# Patient Record
Sex: FEMALE | Race: WHITE
[De-identification: names, ages, dates, MRNs, and addresses within clinical notes are randomized per-mention and may not be internally consistent; named-entity substitution may affect disease eponyms.]

---

## 2021-03-18 ENCOUNTER — HOSPITAL ENCOUNTER (OUTPATIENT)
Dept: HOSPITAL 47 - RADMAMWWP | Age: 46
Discharge: HOME | End: 2021-03-18
Attending: OBSTETRICS & GYNECOLOGY
Payer: COMMERCIAL

## 2021-03-18 DIAGNOSIS — Z12.31: Primary | ICD-10-CM

## 2021-03-18 PROCEDURE — 77067 SCR MAMMO BI INCL CAD: CPT

## 2021-03-22 NOTE — MM
Reason for exam: screening  (asymptomatic).

Last mammogram was performed 5 years and 11 months ago.



History:

Patient is postmenopausal.

Took hormonal contraceptives for 10 years.



Physical Findings:

A clinical breast exam by your physician is recommended on an annual basis and 

results should be correlated with mammographic findings.



MG Screening Mammo w CAD

Bilateral CC and MLO view(s) were taken.

Prior study comparison: April 9, 2015, bilateral MG screening mammo w CAD.  

October 13, 2010, bilateral digital screening mammogram.

The breast tissue is heterogeneously dense. This may lower the sensitivity of 

mammography.  There is no discrete abnormality.





ASSESSMENT: Negative, BI-RAD 1



RECOMMENDATION:

Routine screening mammogram of both breasts in 1 year.

## 2022-07-06 ENCOUNTER — HOSPITAL ENCOUNTER (OUTPATIENT)
Dept: HOSPITAL 47 - RADUSWWP | Age: 47
Discharge: HOME | End: 2022-07-06
Attending: SURGERY
Payer: COMMERCIAL

## 2022-07-06 DIAGNOSIS — R10.11: Primary | ICD-10-CM

## 2022-07-06 PROCEDURE — 76705 ECHO EXAM OF ABDOMEN: CPT

## 2022-07-06 NOTE — US
EXAMINATION TYPE: US gallbladder

 

DATE OF EXAM: 7/6/2022

 

COMPARISON: NONE

 

CLINICAL HISTORY: R10.11 RUQ pain. Patient was having RUQ, but has stopped a few weeks ago.

 

EXAM MEASUREMENTS:

 

Liver Length:  12.4 cm   

Gallbladder Wall:  0.17 cm   

CBD:  0.36 cm

Right Kidney:  10.5 x 4.9 x 3.2 cm

 

 

 

Pancreas:  wnl

Liver:  wnl  

Gallbladder:  wnl

**Evidence for sonographic Wakefield's sign:  No

CBD:  wnl 

Right Kidney:  wnl 

 

 

 

IMPRESSION: No gallstones or ultrasound evidence for acute cholecystitis.

## 2022-08-31 ENCOUNTER — HOSPITAL ENCOUNTER (OUTPATIENT)
Dept: HOSPITAL 47 - ORWHC2ENDO | Age: 47
Discharge: HOME | End: 2022-08-31
Attending: SURGERY
Payer: COMMERCIAL

## 2022-08-31 VITALS — BODY MASS INDEX: 29.2 KG/M2

## 2022-08-31 VITALS — DIASTOLIC BLOOD PRESSURE: 68 MMHG | SYSTOLIC BLOOD PRESSURE: 111 MMHG | HEART RATE: 67 BPM

## 2022-08-31 VITALS — TEMPERATURE: 97 F | RESPIRATION RATE: 16 BRPM

## 2022-08-31 DIAGNOSIS — Z79.899: ICD-10-CM

## 2022-08-31 DIAGNOSIS — Z12.11: Primary | ICD-10-CM

## 2022-08-31 DIAGNOSIS — K64.0: ICD-10-CM

## 2022-08-31 DIAGNOSIS — Z98.891: ICD-10-CM

## 2022-08-31 DIAGNOSIS — Z89.9: ICD-10-CM

## 2022-08-31 PROCEDURE — 45378 DIAGNOSTIC COLONOSCOPY: CPT

## 2022-08-31 NOTE — P.GSHP
History of Present Illness


H&P Date: 22














CHIEF COMPLAINT: Colon screen





HISTORY OF PRESENT ILLNESS: The patient is a 46-year-old female who


presents for colon screen.  Lower endoscopy was offered for further evaluation 

and management.





PAST MEDICAL HISTORY: 


Please see list.





PAST SURGICAL HISTORY: 


Please see list.





MEDICATIONS: 


Please see list.





ALLERGIES:  Please see list. 





SOCIAL HISTORY: No illicit drug use





FAMILY HISTORY: No reports of Crohn disease or ulcerative colitis. 





REVIEW OF ORGAN SYSTEMS: 


CONSTITUTIONAL: No reports of fevers or chills.  





PHYSICAL EXAM: 


VITAL SIGNS:  Stable


GENERAL: Well-developed pleasant in no acute distress. 


HEENT: No scleral icterus. Extraocular movements grossly


intact. Moist buccal mucosa. 


NECK: Supple without lymphadenopathy. 


CHEST: Unlabored respirations. Equal bilateral excursions. 


CARDIOVASCULAR: Regular rate and rhythm. Distal 2+ pulses. 


ABDOMEN: Soft, nontender, nondistended. 


MUSCULOSKELETAL: No clubbing, cyanosis, or edema. 





ASSESSMENT: 


1.  Colon screen.





PLAN: 


1. Recommend proceeding with a lower endoscopy





Past Medical History


Past Medical History: No Reported History


History of Any Multi-Drug Resistant Organisms: None Reported


Past Surgical History:  Section


Additional Past Surgical History / Comment(s): bunionectomy


Past Anesthesia/Blood Transfusion Reactions: No Reported Reaction, Family 

History of Problems w/ Anesthesia


Additional Past Anesthesia/Blood Transfusion Reaction / Comment(s): mother= 

difficulty waking up


Past Psychological History: No Psychological Hx Reported


Smoking Status: Never smoker


Past Alcohol Use History: None Reported


Past Drug Use History: None Reported





- Past Family History


  ** Mother


Family Medical History: No Reported History





Medications and Allergies


                                Home Medications











 Medication  Instructions  Recorded  Confirmed  Type


 


Iron (Unknown Dose) 1 dose PO DAILY 22 History


 


Multivit with Calcium,Iron,Min 1 each PO DAILY 22 History





[Women's Multivitamin]    


 


Zinc (Unknown Dose) 1 dose PO DAILY 22 History








                                    Allergies











Allergy/AdvReac Type Severity Reaction Status Date / Time


 


No Known Allergies Allergy   Verified 22 07:25














Surgical - Exam


                                   Vital Signs











Temp Pulse Resp BP Pulse Ox


 


 97.0 F L  73   16   120/61   99 


 


 22 07:16  22 07:16  22 07:16  22 07:16  22 07:16

## 2022-08-31 NOTE — P.PCN
Date of Procedure: 08/31/22


Description of Procedure: 








PREOPERATIVE DIAGNOSIS:


Colonoscopy screening.





POSTOPERATIVE DIAGNOSIS:


Colonoscopy screening.








OPERATION:


Colonoscopy to the cecum, ileocecal valve and appendiceal orifice.





SURGEON: Samantha Jason MD.





ANESTHESIA: MAC.





INDICATIONS:


The patient is a 46-year-old female who presents for colonoscopy screening.  

Benefits and risks were described and informed consent was obtained.





DESCRIPTION OF PROCEDURE:


The patient had undergone Sutab prep.  The patient had been brought into the 

operating room and laid in the left lateral decubitus position. After adequate 

intravenous sedation, the rectum was examined with 2% lidocaine jelly. No 

external hemorrhoids were encountered. The rectal tone was within normal limits.

No lesions were palpated in the rectal vault. An Olympus colonoscope was 

advanced until the cecum, ileocecal valve and appendiceal orifice were clearly 

viewed.  The prep was excellent. No scattered diverticulosis was encountered.  

No colonic polyps were found.  No evidence of focal colitis was found. 

Retroflexion of the scope demonstrated grade 1 internal hemorrhoids without 

active bleeding or inflammation. The colon was desufflated. The patient had 

tolerated the procedure well. 





Withdrawal time was over 6 minutes.





FINDINGS:


Aronchick preparation quality scale 1 (1-5)


Internal hemorrhoids, grade 1


No external prolapsed hemorrhoids.


No arteriovenous malformations.


No adenomatous polyps.


No focal colitis.


No scattered diverticulosis was encountered.





RECOMMENDATIONS:


Lower endoscopy in 10 years, 2032





Plan - Discharge Summary


New Discharge Prescriptions: 


Continue


   Multivit with Calcium,Iron,Min [Women's Multivitamin] 1 each PO DAILY


   Iron (Unknown Dose) 1 dose PO DAILY


   Zinc (Unknown Dose) 1 dose PO DAILY


Discharge Medication List





Iron (Unknown Dose) 1 dose PO DAILY 08/30/22 [History]


Multivit with Calcium,Iron,Min [Women's Multivitamin] 1 each PO DAILY 08/30/22 

[History]


Zinc (Unknown Dose) 1 dose PO DAILY 08/30/22 [History]








Follow up Appointment(s)/Referral(s): 


Samantha Jason MD [STAFF PHYSICIAN] - As Needed


Patient Instructions/Handouts:  Colonoscopy (DC), *Surgery [facility] - (Anesthesia) 

Endoscopy Discharge Instructions


Activity/Diet/Wound Care/Special Instructions: 


Repeat colonoscopy  in 10 years, 2032


Discharge Disposition: HOME SELF-CARE

## 2022-10-04 ENCOUNTER — HOSPITAL ENCOUNTER (OUTPATIENT)
Dept: HOSPITAL 47 - RADMAMWWP | Age: 47
Discharge: HOME | End: 2022-10-04
Attending: OBSTETRICS & GYNECOLOGY
Payer: COMMERCIAL

## 2022-10-04 DIAGNOSIS — Z78.0: ICD-10-CM

## 2022-10-04 DIAGNOSIS — M81.0: ICD-10-CM

## 2022-10-04 DIAGNOSIS — Z12.31: Primary | ICD-10-CM

## 2022-10-04 PROCEDURE — 77080 DXA BONE DENSITY AXIAL: CPT

## 2022-10-04 PROCEDURE — 77067 SCR MAMMO BI INCL CAD: CPT

## 2022-10-04 NOTE — BD
EXAMINATION TYPE: Axial Bone Density

 

DATE OF EXAM: 10/4/2022

 

COMPARISON: NONE

 

CLINICAL HISTORY: 46 years year old Female.  ICD-10 CODE:  N95.1 Post menopausal

 

Height:  62

Weight:  137.8

 

FRAX RISK QUESTIONS:

Alcohol (3 or more units per day):  NO

Family History (Parent hip fracture):  NO

Glucocorticoids (More than 3mos):  NO

History of Fracture in Adulthood: YES, PELVIS, BILAT LOWER LEGS, 

 

Secondary Osteoporosis:

  1.  Type 1 Diabetes: NO

  2.  Hyperthyroidism: NO

  3.  Menopause before 45: NO

  4.  Malnutrition: NO

  5.  Chronic liver disease: NO

Rheumatoid Arthritis: NO

Current Tobacco Use: NO

 

RISK FACTORS 

HISTORY OF: 

Hip Fracture (Right/Left): NO

Spine Fracture: NO

History of Wrist Fracture: NO

Surgery to Spine/Hip(right/left)/Wrist (right/left): NO

Family History of Osteoporosis: NO

Active: YES

Diet low in dairy products/other sources of calcium:  NO

Postmenopausal woman: YES

Take estrogen and/or progesterone medications: NO

Lost more than 2 inches in height since high school: NO

Frequent falls: NO

Poor Health: NO

Hyperparathyroidism: NO

Adrenal Insufficiency: NO

 

MEDICATIONS: 

Prednisone or other steroids: NO

Thyroid Medications: NO 

Osteoporosis Medications: NO

Additional Medications: MULTI VIT., ZINC, B12, IRON

 

 

EXAM MEASUREMENTS: 

Bone mineral densitometry was performed using the [employer] System.

Bone mineral density as measured about the Lumbar spine is:  

----- L1-L4(G/cm2): 0.807

T Score Values are as follows:

----- L1: -2.4

----- L2: -3.7

----- L3: -2.6

----- L4: -3.8

----- L1-L4: -3.1

BASELINE STUDY

 

Bone mineral density about the R hip (g/cm2): 0.808

Bone mineral density about the L hip (g/cm2): 0.758

T Score values are as follows:

-----R Neck: -1.7

-----L Neck: -2.0

-----R Total: -0.9

-----L Total: -1.2

BASELINE STUDY 

 

 

FRAX%s: The graph provided illustrates a 8.6% chance for a major osteoporotic fx and a 1.4% chance fo
r the hips probability for fx in 10 years time.

 

IMPRESSION:

Osteoporosis (T Score less than -2.5).

 

There is increased fracture risk and therapy is usually indicated based on age.

 

Re-Screen 1-2 years.

 

NOTE:  T-SCORE=SD OF THE YOUNG ADULT MEAN.

## 2022-10-04 NOTE — MM
Reason for Exam: Screening  (asymptomatic). 

Last mammogram was performed 1 year(s) and 7 month(s) ago. 





Patient History: 

Menarche at age 11. First Full-Term Pregnancy at age 29. Late child-bearing (after 29).

Postmenopausal. Patient used Hormonal Contraceptives for 10 years. 





Risk Values: 

Mima 5 year model risk: 1.2%.

NCI Lifetime model risk: 12.7%.





Prior Study Comparison: 

10/13/2010 Bilateral Screening Mammogram, [facility]. 4/9/2015 Bilateral Screening Mammogram, [facility].

3/18/2021 Bilateral Screening Mammogram, [facility]. 





Tissue Density: 

The breast tissue is heterogeneously dense. This may lower the sensitivity of mammography.





Findings: 

Analyzed By CAD. 

There is no suspicious group of microcalcifications or new suspicious mass in either breast. Stable

chronic nodularity within the right breast. No significant change from prior exams. 





Overall Assessment: Benign, BI-RAD 2





Management: 

Screening Mammogram of both breasts in 1 year.

A clinical breast exam by your physician is recommended on an annual basis and results should be

correlated with mammographic findings.



Electronically signed and approved by: Jesu Zaidi D.O.

## 2022-10-11 ENCOUNTER — HOSPITAL ENCOUNTER (OUTPATIENT)
Dept: HOSPITAL 47 - LABWHC1 | Age: 47
Discharge: HOME | End: 2022-10-11
Attending: OBSTETRICS & GYNECOLOGY
Payer: COMMERCIAL

## 2022-10-11 DIAGNOSIS — Z13.29: ICD-10-CM

## 2022-10-11 DIAGNOSIS — R53.83: ICD-10-CM

## 2022-10-11 DIAGNOSIS — Z13.1: ICD-10-CM

## 2022-10-11 DIAGNOSIS — Z00.00: Primary | ICD-10-CM

## 2022-10-11 DIAGNOSIS — Z13.220: ICD-10-CM

## 2022-10-11 LAB
ALBUMIN SERPL-MCNC: 4.4 G/DL (ref 3.8–4.9)
ALBUMIN/GLOB SERPL: 1.69 G/DL (ref 1.6–3.17)
ALP SERPL-CCNC: 69 U/L (ref 41–126)
ALT SERPL-CCNC: 23 U/L (ref 8–44)
ANION GAP SERPL CALC-SCNC: 7.9 MMOL/L (ref 10–18)
AST SERPL-CCNC: 24 U/L (ref 13–35)
BUN SERPL-SCNC: 13.1 MG/DL (ref 9–27)
BUN/CREAT SERPL: 16.38 RATIO (ref 12–20)
CALCIUM SPEC-MCNC: 9.3 MG/DL (ref 8.7–10.3)
CHLORIDE SERPL-SCNC: 106 MMOL/L (ref 96–109)
CO2 SERPL-SCNC: 27.1 MMOL/L (ref 20–27.5)
ERYTHROCYTE [DISTWIDTH] IN BLOOD BY AUTOMATED COUNT: 4.49 X 10*6/UL (ref 4.1–5.2)
ERYTHROCYTE [DISTWIDTH] IN BLOOD: 12.3 % (ref 11.5–14.5)
GLOBULIN SER CALC-MCNC: 2.6 G/DL (ref 1.6–3.3)
GLUCOSE SERPL-MCNC: 91 MG/DL (ref 70–110)
HCT VFR BLD AUTO: 42.1 % (ref 37.2–46.3)
HGB BLD-MCNC: 14 G/DL (ref 12–15)
MCH RBC QN AUTO: 31.2 PG (ref 27–32)
MCHC RBC AUTO-ENTMCNC: 33.3 G/DL (ref 32–37)
MCV RBC AUTO: 93.8 FL (ref 80–97)
NRBC BLD AUTO-RTO: 0 /100 WBCS (ref 0–0)
PLATELET # BLD AUTO: 222 X 10*3/UL (ref 140–440)
POTASSIUM SERPL-SCNC: 4.1 MMOL/L (ref 3.5–5.5)
PROT SERPL-MCNC: 7 G/DL (ref 6.2–8.2)
SODIUM SERPL-SCNC: 141 MMOL/L (ref 135–145)
T4 FREE SERPL-MCNC: 1.04 NG/DL (ref 0.8–1.8)
WBC # BLD AUTO: 4.93 X 10*3/UL (ref 4.5–10)

## 2022-10-11 PROCEDURE — 84439 ASSAY OF FREE THYROXINE: CPT

## 2022-10-11 PROCEDURE — 85027 COMPLETE CBC AUTOMATED: CPT

## 2022-10-11 PROCEDURE — 84443 ASSAY THYROID STIM HORMONE: CPT

## 2022-10-11 PROCEDURE — 80053 COMPREHEN METABOLIC PANEL: CPT

## 2022-10-11 PROCEDURE — 82306 VITAMIN D 25 HYDROXY: CPT

## 2022-10-11 PROCEDURE — 83036 HEMOGLOBIN GLYCOSYLATED A1C: CPT

## 2022-10-11 PROCEDURE — 36415 COLL VENOUS BLD VENIPUNCTURE: CPT
